# Patient Record
Sex: MALE | Race: WHITE | NOT HISPANIC OR LATINO | URBAN - METROPOLITAN AREA
[De-identification: names, ages, dates, MRNs, and addresses within clinical notes are randomized per-mention and may not be internally consistent; named-entity substitution may affect disease eponyms.]

---

## 2017-01-06 ENCOUNTER — Encounter (OUTPATIENT)
Dept: URBAN - METROPOLITAN AREA CLINIC 10 | Facility: CLINIC | Age: 67
End: 2017-01-06
Payer: COMMERCIAL

## 2017-01-06 DIAGNOSIS — Z01.818 ENCOUNTER FOR OTHER PREPROCEDURAL EXAMINATION: Primary | ICD-10-CM

## 2017-01-06 PROCEDURE — 92025 CPTRIZED CORNEAL TOPOGRAPHY: CPT | Performed by: OPTOMETRIST

## 2017-01-06 PROCEDURE — 99213 OFFICE O/P EST LOW 20 MIN: CPT | Performed by: PHYSICIAN ASSISTANT

## 2017-01-06 RX ORDER — ASPIRIN 81 MG/1
81 MG TABLET, COATED ORAL
Qty: 0 | Refills: 0 | Status: ACTIVE
Start: 2017-01-06

## 2017-01-06 RX ORDER — LORATADINE 10 MG
10 MG TABLET ORAL
Qty: 0 | Refills: 0 | Status: ACTIVE
Start: 2017-01-06

## 2017-01-09 ENCOUNTER — FOLLOW UP ESTABLISHED (OUTPATIENT)
Dept: URBAN - METROPOLITAN AREA CLINIC 10 | Facility: CLINIC | Age: 67
End: 2017-01-09
Payer: COMMERCIAL

## 2017-01-09 DIAGNOSIS — H40.013 OPEN ANGLE WITH BORDERLINE FINDINGS, LOW RISK, BILATERAL: ICD-10-CM

## 2017-01-09 DIAGNOSIS — H25.13 AGE-RELATED NUCLEAR CATARACT, BILATERAL: Primary | ICD-10-CM

## 2017-01-09 PROCEDURE — 92014 COMPRE OPH EXAM EST PT 1/>: CPT | Performed by: OPHTHALMOLOGY

## 2017-01-09 RX ORDER — OFLOXACIN 3 MG/ML
0.3 % SOLUTION/ DROPS OPHTHALMIC
Qty: 1 | Refills: 2 | Status: INACTIVE
Start: 2017-01-09 | End: 2017-02-21

## 2017-01-09 RX ORDER — PREDNISOLONE ACETATE 10 MG/ML
1 % SUSPENSION/ DROPS OPHTHALMIC
Qty: 1 | Refills: 2 | Status: INACTIVE
Start: 2017-01-09 | End: 2018-04-25

## 2017-01-09 RX ORDER — DICLOFENAC SODIUM 1 MG/ML
0.1 % SOLUTION/ DROPS OPHTHALMIC
Qty: 1 | Refills: 2 | Status: INACTIVE
Start: 2017-01-09 | End: 2017-02-21

## 2017-01-09 ASSESSMENT — INTRAOCULAR PRESSURE
OS: 20
OD: 20

## 2017-01-17 ENCOUNTER — SURGERY (OUTPATIENT)
Dept: URBAN - METROPOLITAN AREA SURGERY 5 | Facility: SURGERY | Age: 67
End: 2017-01-17
Payer: COMMERCIAL

## 2017-01-17 PROCEDURE — 66984 XCAPSL CTRC RMVL W/O ECP: CPT | Performed by: OPHTHALMOLOGY

## 2017-01-18 ENCOUNTER — POST OP (OUTPATIENT)
Dept: URBAN - METROPOLITAN AREA CLINIC 10 | Facility: CLINIC | Age: 67
End: 2017-01-18

## 2017-01-18 DIAGNOSIS — Z96.1 PRESENCE OF INTRAOCULAR LENS: Primary | ICD-10-CM

## 2017-01-18 PROCEDURE — 99024 POSTOP FOLLOW-UP VISIT: CPT | Performed by: OPTOMETRIST

## 2017-01-18 ASSESSMENT — INTRAOCULAR PRESSURE: OD: 13

## 2017-01-24 ENCOUNTER — POST OP (OUTPATIENT)
Dept: URBAN - METROPOLITAN AREA CLINIC 10 | Facility: CLINIC | Age: 67
End: 2017-01-24

## 2017-01-24 PROCEDURE — 99024 POSTOP FOLLOW-UP VISIT: CPT | Performed by: OPTOMETRIST

## 2017-01-24 ASSESSMENT — VISUAL ACUITY
OS: 20/50
OD: 20/20

## 2017-01-24 ASSESSMENT — INTRAOCULAR PRESSURE
OS: 12
OD: 10

## 2017-01-31 ENCOUNTER — SURGERY (OUTPATIENT)
Dept: URBAN - METROPOLITAN AREA SURGERY 5 | Facility: SURGERY | Age: 67
End: 2017-01-31
Payer: COMMERCIAL

## 2017-01-31 PROCEDURE — 66984 XCAPSL CTRC RMVL W/O ECP: CPT | Performed by: OPHTHALMOLOGY

## 2017-02-01 ENCOUNTER — POST OP (OUTPATIENT)
Dept: URBAN - METROPOLITAN AREA CLINIC 10 | Facility: CLINIC | Age: 67
End: 2017-02-01

## 2017-02-01 PROCEDURE — 99024 POSTOP FOLLOW-UP VISIT: CPT | Performed by: OPTOMETRIST

## 2017-02-01 ASSESSMENT — INTRAOCULAR PRESSURE: OS: 16

## 2017-02-21 ENCOUNTER — POST OP (OUTPATIENT)
Dept: URBAN - METROPOLITAN AREA CLINIC 10 | Facility: CLINIC | Age: 67
End: 2017-02-21

## 2017-02-21 PROCEDURE — 99024 POSTOP FOLLOW-UP VISIT: CPT | Performed by: OPTOMETRIST

## 2017-02-21 ASSESSMENT — INTRAOCULAR PRESSURE
OD: 16
OS: 16

## 2017-02-21 ASSESSMENT — VISUAL ACUITY
OS: 20/20
OD: 20/20

## 2018-04-25 ENCOUNTER — FOLLOW UP ESTABLISHED (OUTPATIENT)
Dept: URBAN - METROPOLITAN AREA CLINIC 10 | Facility: CLINIC | Age: 68
End: 2018-04-25
Payer: COMMERCIAL

## 2018-04-25 DIAGNOSIS — H40.023 OPEN ANGLE WITH BORDERLINE FINDINGS, HIGH RISK, BILATERAL: Primary | ICD-10-CM

## 2018-04-25 PROCEDURE — 92014 COMPRE OPH EXAM EST PT 1/>: CPT | Performed by: OPTOMETRIST

## 2018-04-25 ASSESSMENT — INTRAOCULAR PRESSURE
OS: 14
OD: 14

## 2018-04-25 ASSESSMENT — VISUAL ACUITY
OD: 20/30
OS: 20/25

## 2018-04-25 ASSESSMENT — KERATOMETRY
OD: 45.63
OS: 46.13

## 2018-05-04 ENCOUNTER — Encounter (OUTPATIENT)
Dept: URBAN - METROPOLITAN AREA CLINIC 10 | Facility: CLINIC | Age: 68
End: 2018-05-04
Payer: COMMERCIAL

## 2018-05-04 PROCEDURE — 99213 OFFICE O/P EST LOW 20 MIN: CPT | Performed by: PHYSICIAN ASSISTANT

## 2018-05-08 ENCOUNTER — SURGERY (OUTPATIENT)
Dept: URBAN - METROPOLITAN AREA SURGERY 5 | Facility: SURGERY | Age: 68
End: 2018-05-08
Payer: COMMERCIAL

## 2018-05-08 PROCEDURE — 66821 AFTER CATARACT LASER SURGERY: CPT | Performed by: OPHTHALMOLOGY

## 2018-05-15 ENCOUNTER — SURGERY (OUTPATIENT)
Dept: URBAN - METROPOLITAN AREA SURGERY 5 | Facility: SURGERY | Age: 68
End: 2018-05-15
Payer: COMMERCIAL

## 2018-05-15 PROCEDURE — 66821 AFTER CATARACT LASER SURGERY: CPT | Performed by: OPHTHALMOLOGY

## 2018-08-08 ENCOUNTER — FOLLOW UP ESTABLISHED (OUTPATIENT)
Dept: URBAN - METROPOLITAN AREA CLINIC 10 | Facility: CLINIC | Age: 68
End: 2018-08-08
Payer: COMMERCIAL

## 2018-08-08 DIAGNOSIS — H26.493 OTHER SECONDARY CATARACT, BILATERAL: ICD-10-CM

## 2018-08-08 PROCEDURE — 92012 INTRM OPH EXAM EST PATIENT: CPT | Performed by: OPTOMETRIST

## 2018-08-08 PROCEDURE — 92133 CPTRZD OPH DX IMG PST SGM ON: CPT | Performed by: OPTOMETRIST

## 2018-08-08 ASSESSMENT — INTRAOCULAR PRESSURE
OS: 14
OD: 17

## 2020-05-18 ENCOUNTER — OFFICE VISIT (OUTPATIENT)
Dept: URBAN - METROPOLITAN AREA CLINIC 32 | Facility: CLINIC | Age: 70
End: 2020-05-18
Payer: COMMERCIAL

## 2020-05-18 PROCEDURE — 99204 OFFICE O/P NEW MOD 45 MIN: CPT | Performed by: OPTOMETRIST

## 2020-05-18 ASSESSMENT — INTRAOCULAR PRESSURE
OD: 12
OS: 12

## 2020-05-18 NOTE — IMPRESSION/PLAN
Impression: Open angle with borderline findings, low risk, bilateral: H40.013.  Plan: Pt ed on condition RTC TA DE OCT HVF PACH 1 month

## 2020-06-26 ENCOUNTER — OFFICE VISIT (OUTPATIENT)
Dept: URBAN - METROPOLITAN AREA CLINIC 32 | Facility: CLINIC | Age: 70
End: 2020-06-26
Payer: MEDICARE

## 2020-06-26 PROCEDURE — 99214 OFFICE O/P EST MOD 30 MIN: CPT | Performed by: OPTOMETRIST

## 2020-06-26 PROCEDURE — 92133 CPTRZD OPH DX IMG PST SGM ON: CPT | Performed by: OPTOMETRIST

## 2020-06-26 RX ORDER — BIMATOPROST 0.1 MG/ML
0.01 % SOLUTION/ DROPS OPHTHALMIC
Qty: 7.5 | Refills: 3 | Status: INACTIVE
Start: 2020-06-26 | End: 2020-09-23

## 2020-06-26 ASSESSMENT — INTRAOCULAR PRESSURE
OS: 12
OD: 11

## 2020-06-26 NOTE — IMPRESSION/PLAN
Impression: Primary open-angle glaucoma, bilateral, moderate stage: G01.0364.  Plan: start lumigan QHS OU AT PRN RTC TA DE OCT

## 2020-10-01 ENCOUNTER — OFFICE VISIT (OUTPATIENT)
Dept: URBAN - METROPOLITAN AREA CLINIC 32 | Facility: CLINIC | Age: 70
End: 2020-10-01
Payer: MEDICARE

## 2020-10-01 DIAGNOSIS — H40.1132 PRIMARY OPEN-ANGLE GLAUCOMA, BILATERAL, MODERATE STAGE: Primary | ICD-10-CM

## 2020-10-01 DIAGNOSIS — H40.013 OPEN ANGLE WITH BORDERLINE FINDINGS, LOW RISK, BILATERAL: ICD-10-CM

## 2020-10-01 PROCEDURE — 92133 CPTRZD OPH DX IMG PST SGM ON: CPT | Performed by: OPTOMETRIST

## 2020-10-01 PROCEDURE — 99214 OFFICE O/P EST MOD 30 MIN: CPT | Performed by: OPTOMETRIST

## 2020-10-01 ASSESSMENT — INTRAOCULAR PRESSURE
OD: 12
OS: 12

## 2020-10-01 NOTE — IMPRESSION/PLAN
Impression: Primary open-angle glaucoma, bilateral, moderate stage: E77.6328.  Plan: restart lumigan QHS OU AT PRN OCT 67/58

## 2021-01-13 ENCOUNTER — OFFICE VISIT (OUTPATIENT)
Dept: URBAN - METROPOLITAN AREA CLINIC 32 | Facility: CLINIC | Age: 71
End: 2021-01-13
Payer: MEDICARE

## 2021-01-13 PROCEDURE — 99214 OFFICE O/P EST MOD 30 MIN: CPT | Performed by: OPTOMETRIST

## 2021-01-13 RX ORDER — BIMATOPROST 0.1 MG/ML
0.01 % SOLUTION/ DROPS OPHTHALMIC
Qty: 0 | Refills: 0 | Status: INACTIVE
Start: 2021-01-13 | End: 2021-12-28

## 2021-01-13 ASSESSMENT — INTRAOCULAR PRESSURE
OS: 13
OD: 12

## 2021-01-13 NOTE — IMPRESSION/PLAN
Impression: Primary open-angle glaucoma, bilateral, moderate stage: I12.0914.  Plan: cont lumigan QHS OU AT PRN OCT 67/58

## 2021-08-19 ENCOUNTER — OFFICE VISIT (OUTPATIENT)
Dept: URBAN - METROPOLITAN AREA CLINIC 32 | Facility: CLINIC | Age: 71
End: 2021-08-19
Payer: MEDICARE

## 2021-08-19 PROCEDURE — 92014 COMPRE OPH EXAM EST PT 1/>: CPT | Performed by: OPTOMETRIST

## 2021-08-19 PROCEDURE — 92133 CPTRZD OPH DX IMG PST SGM ON: CPT | Performed by: OPTOMETRIST

## 2021-08-19 ASSESSMENT — INTRAOCULAR PRESSURE
OS: 12
OD: 12

## 2021-08-19 NOTE — IMPRESSION/PLAN
Impression: Primary open-angle glaucoma, bilateral, moderate stage: X40.8092.  Plan: cont lumigan Q OU AT PRN OCT 64/58, refer for eval with Dr Dimitry Garcia, consider Hydrus

## 2021-11-09 ENCOUNTER — OFFICE VISIT (OUTPATIENT)
Dept: URBAN - METROPOLITAN AREA CLINIC 23 | Facility: CLINIC | Age: 71
End: 2021-11-09
Payer: MEDICARE

## 2021-11-09 DIAGNOSIS — H40.1131 BILATERAL PRIMARY OPEN-ANGLE GLAUCOMA, MILD STAGE: Primary | ICD-10-CM

## 2021-11-09 PROCEDURE — 76514 ECHO EXAM OF EYE THICKNESS: CPT | Performed by: OPHTHALMOLOGY

## 2021-11-09 PROCEDURE — 92004 COMPRE OPH EXAM NEW PT 1/>: CPT | Performed by: OPHTHALMOLOGY

## 2021-11-09 PROCEDURE — 92083 EXTENDED VISUAL FIELD XM: CPT | Performed by: OPHTHALMOLOGY

## 2021-11-09 PROCEDURE — 92020 GONIOSCOPY: CPT | Performed by: OPHTHALMOLOGY

## 2021-11-09 ASSESSMENT — INTRAOCULAR PRESSURE
OD: 17
OS: 17

## 2021-11-09 NOTE — IMPRESSION/PLAN
Impression: Bilateral primary open-angle glaucoma, mild stage: H40.1131. Plan: Pt has Glaucoma    Gonio :SS 2+        Pachs:  523/553    Today's IOP : 17/17(Forgot drop last night)  Tmax: 17/17 Target IOP low to mid teens Pt denies Fhx of Glaucoma Right is the better seeing eye (Last vf OD: inferior changes OS inferior nasal step 11/9/21 C/D: 0.7x0.7 OCT: 74/60 (08/20/21) Pt denies Sulfa Allergy   // Pt denies Lung /Heart dx Plan :
1. Continue Lumigan ou qhs
2. Recommend SLT OD then  The patient is aware of the limitations of SLT , which can lower IOP 2-4mm for 6--12 months. The Patient is aware that SLT cannot improve the vision nor eliminate the need for the topical medications. If on any glaucoma medications, the patient is aware that SLT is not a replacement for the current medical regimen. **Risk level 1
*** 6-8 week Follow up after laser **Pred TID x 1 week

## 2021-11-30 ENCOUNTER — SURGERY (OUTPATIENT)
Dept: URBAN - METROPOLITAN AREA SURGERY 11 | Facility: SURGERY | Age: 71
End: 2021-11-30
Payer: MEDICARE

## 2021-11-30 PROCEDURE — 65855 TRABECULOPLASTY LASER SURG: CPT | Performed by: OPHTHALMOLOGY

## 2021-12-22 ENCOUNTER — OFFICE VISIT (OUTPATIENT)
Dept: URBAN - METROPOLITAN AREA CLINIC 32 | Facility: CLINIC | Age: 71
End: 2021-12-22
Payer: MEDICARE

## 2021-12-22 PROCEDURE — 92014 COMPRE OPH EXAM EST PT 1/>: CPT | Performed by: OPTOMETRIST

## 2021-12-22 ASSESSMENT — INTRAOCULAR PRESSURE
OS: 17
OD: 13
OS: 18

## 2021-12-22 ASSESSMENT — KERATOMETRY
OD: 45.75
OS: 46.00

## 2021-12-22 NOTE — IMPRESSION/PLAN
Impression: Bilateral primary open-angle glaucoma, mild stage: H40.1131. Plan: Pt has Glaucoma    Gonio :SS 2+        Pachs:  523/553    Today's IOP : 17/17(Forgot drop last night)  Tmax: 17/17 Target IOP low to mid teens Pt denies Fhx of Glaucoma Right is the better seeing eye (Last vf OD: inferior changes OS inferior nasal step 11/9/21 C/D: 0.7x0.7 OCT: 74/60 (08/20/21) Pt denies Sulfa Allergy   // Pt denies Lung /Heart dx Plan :
1. Continue  Lumigan ou qhs
2.  recent SLT with lower IOP OD monitor 3 months Ilia ACKERMAN OCT

## 2021-12-28 ENCOUNTER — SURGERY (OUTPATIENT)
Dept: URBAN - METROPOLITAN AREA SURGERY 11 | Facility: SURGERY | Age: 71
End: 2021-12-28
Payer: MEDICARE

## 2021-12-28 PROCEDURE — 65855 TRABECULOPLASTY LASER SURG: CPT | Performed by: OPHTHALMOLOGY

## 2021-12-28 RX ORDER — BIMATOPROST 0.1 MG/ML
0.01 % SOLUTION/ DROPS OPHTHALMIC
Qty: 2.5 | Refills: 5 | Status: ACTIVE
Start: 2021-12-28

## 2022-01-28 ENCOUNTER — OFFICE VISIT (OUTPATIENT)
Dept: URBAN - METROPOLITAN AREA CLINIC 32 | Facility: CLINIC | Age: 72
End: 2022-01-28
Payer: MEDICARE

## 2022-01-28 PROCEDURE — 99214 OFFICE O/P EST MOD 30 MIN: CPT | Performed by: OPTOMETRIST

## 2022-01-28 ASSESSMENT — INTRAOCULAR PRESSURE
OS: 14
OD: 14

## 2022-01-28 NOTE — IMPRESSION/PLAN
Impression: Bilateral primary open-angle glaucoma, mild stage: H40.1131. Plan: Pt has Glaucoma    Gonio :SS 2+        Pachs:  523/553    Today's IOP : 17/17(Forgot drop last night)  Tmax: 17/17 Target IOP low to mid teens Pt denies Fhx of Glaucoma Right is the better seeing eye (Last vf OD: inferior changes OS inferior nasal step 11/9/21 C/D: 0.7x0.7 OCT: 74/60 (08/20/21) Pt denies Sulfa Allergy   // Pt denies Lung /Heart dx Plan :
1. Continue  Lumigan ou qhs
2.  recent SLT with lower IOP OD monitor 3 months tA DE OCT, consider removing lumigan